# Patient Record
Sex: MALE | Race: WHITE
[De-identification: names, ages, dates, MRNs, and addresses within clinical notes are randomized per-mention and may not be internally consistent; named-entity substitution may affect disease eponyms.]

---

## 2017-01-02 DIAGNOSIS — L40.9 PSORIASIS: Primary | ICD-10-CM

## 2017-01-02 NOTE — TELEPHONE ENCOUNTER
Patient called and updated contact info for phone.  Completed the needed lab follow up,   But never got refill of Methotrexate.    Is patient to remain on med?  and if so  Needs refills.    Moon Colunga RN  [facility]

## 2017-01-02 NOTE — Clinical Note
[facility]  [address] [place] [address]  Phone: [phone]    January 11, 2017        Brad Vidal  [address] [place] [address]              Dear  Young,    We have tried to reach you by telephone to discuss your labs for medication. It is important to make sure that we can follow up with you after having labs done. Please make an appointment to follow up in clinic in two months for further refills. You can call [phone].              Sincerely,      Clara CARBAJAL / TR

## 2017-01-03 NOTE — TELEPHONE ENCOUNTER
Left message for pt to call clinic back.  Need to know if the first labs he did on 11/16/16 were one week after methotrexate test dose.  Nury RAO RN BSN PHN  [facility]

## 2017-01-03 NOTE — TELEPHONE ENCOUNTER
I did send refill but I just wanted to confirm second lab work was one week after test dosage. Since there was about one month between baseline blood work and repeat blood work.

## 2017-01-11 NOTE — TELEPHONE ENCOUNTER
Have not heard back from patient.    Do you want me to send a letter? Or?..    Please advise. Lilia Cochran RN

## 2017-01-13 NOTE — TELEPHONE ENCOUNTER
Pt called back and reported that he did not have his blood drawn after the test dose of methotrexate. He also stated that he is just picking up the prescription now and will come to clinic in two months to be seen in clinic.  Advised pt to make sure he kept his appointment and to call if he had questions or concerns.  Nury RAO RN BSN PHN  [facility]

## 2017-12-01 ENCOUNTER — HEALTH MAINTENANCE LETTER (OUTPATIENT)
Age: 57
End: 2017-12-01